# Patient Record
Sex: MALE | Race: WHITE
[De-identification: names, ages, dates, MRNs, and addresses within clinical notes are randomized per-mention and may not be internally consistent; named-entity substitution may affect disease eponyms.]

---

## 2020-03-06 ENCOUNTER — HOSPITAL ENCOUNTER (OUTPATIENT)
Dept: HOSPITAL 92 - SCSMRI | Age: 40
Discharge: HOME | End: 2020-03-06
Attending: ORTHOPAEDIC SURGERY
Payer: COMMERCIAL

## 2020-03-06 DIAGNOSIS — Z98.890: ICD-10-CM

## 2020-03-06 DIAGNOSIS — S69.82XA: Primary | ICD-10-CM

## 2020-03-06 NOTE — MRI
MRI OF LEFT WRIST PERFORMED WITHOUT CONTRAST ENHANCEMENT:

 

HISTORY: 

Patient had wrist surgery in September of 2019 and has been having pain since the time of surgery.

 

FINDINGS: 

There is artifact related to pin and screw placement which substantially degrades image detail.  Marion
fact is particularly pronounced around the region of the distal radius with screw placement in this r
egion.  On these views, there appears to be an ulna plus variant.  I am not certain how much of this 
is related to the positioning.  The triangular fibrocartilage is difficult to assess, but I feel it i
s most likely intact.

 

Surgical anchors are seen within the scaphoid and there is artifact near the trapezium.  The scapholu
chapin and lunotriquetral joints are unremarkable.  I do not see any definite ligamentous injury.

 

Carpal tunnel region is normal.  The extensor tendons are somewhat difficult to assess at the level o
f the radius.  The ERCB and ERCL tendons appear slightly thickened.  Some minimal tenosynovitis diehl
e and some minimal edema change and what appears to be some scarring around these tendons.  There is 
what may be a small bony fragment from the dorsal side of the scaphoid in this region, again difficul
t to assess.  I am not certain if CT would be helpful in assessment of this  if patient's pain is spe
cifically related to this region.  

 

Postop changes of the metacarpal regions are also incidentally seen incompletely imaged on this exam.
  

 

IMPRESSION: 

1.  Extensive postop change degrades image quality.  Screw placement in the distal radius related to 
fixation of fracture degrades the imaging quality.  I believe the scapholunate and lunotriquetral lig
aments are intact.  On some of these images, there appears to be an ulnar plus variant.  I believe th
is may just be more related to positioning.  Triangular fibrocartilage is difficult to assess, but on
 some of these images I feel is probably intact.

 

2.  Suggestion of some slight thickening to the extensor carpi radialis brevis and extensor carpi rad
ialis longus tendons at the level of the distal radius and as they pass over the dorsal side of the r
egion of the scaphoid.  On some of these images, there is a suggestion there may be a small avulsion 
fracture off the dorsal side of the more proximal aspect of the scaphoid.  There are some trace tenos
ynovitis changes and some soft tissue edema changes adjacent to the extensor carpi radialis brevis an
d extensor carpi radialis longus tendons.

 

POS: C

## 2020-03-26 ENCOUNTER — HOSPITAL ENCOUNTER (OUTPATIENT)
Dept: HOSPITAL 92 - SCSCT | Age: 40
Discharge: HOME | End: 2020-03-26
Attending: ORTHOPAEDIC SURGERY
Payer: COMMERCIAL

## 2020-03-26 DIAGNOSIS — S62.337D: ICD-10-CM

## 2020-03-26 DIAGNOSIS — S62.035D: Primary | ICD-10-CM

## 2020-03-26 DIAGNOSIS — S62.335D: ICD-10-CM

## 2020-03-26 DIAGNOSIS — S52.502D: ICD-10-CM

## 2020-03-26 NOTE — CT
EXAM:

LEFT WRIST CT SCAN WITHOUT IV CONTRAST:

3/26/20

 

HISTORY: 

Follow-up fractures and follow-up postoperative changes. 

 

COMPARISON: 

Prior MRI, 3/6/2020 and wrist plain film examination 9/25/2019.

 

Three internal fixation pins and one internal fixation screw transversely stabilize and fixate the di
stal radial epiphysis. The previously noted displaced  somewhat comminuted radial fracture has marked
ly improved in position and alignment with evidence for some healing. There appears to be a healed fr
acture of the tip of the ulnar styloid process as well as healed fractures involving the proximal fou
rth and fifth metacarpals. There is a circumscribed 0.3 x 0.4 cm diameter ossific focus on the radial
 side of the distal radial styloid process which does not appear to involve the navicular bone. I fee
l this is probably a displaced fracture from the previously noted comminuted radial fracture that has
 not reunited. 

There is a small bony projection also noted off the distal end of the radial styloid process which ma
y still be attached to the distal radial styloid and represent at least partially attached bony fragm
ent. There is some heterogeneous attenuation within the scaphoid bone with some associated deformity 
but no evidence for an acute fracture. Scapholunate and lunatotriquetral joint regions appear intact.
 No evidence for new fractures. 

 

There are in addition, two other very small, one approximates 1 mm in diameter, the second one measur
es approximately 1 x 2 mm in diameter, small circumscribed bony densities on the radial side of the d
istal scaphoid bone near the triscaphe joint. These also appear to be old  tiny circumscribed ossific
 densities possibly residual tiny chip type fractures. 

 

IMPRESSION: 

Evidence for healing distal radial and proximal fourth and fifth metatarsal fractures. No evidence fo
r acute fracture. Minimal deformity of the scaphoid bone, evidence for old injury but again, no acute
 fracture or dislocation. Several small ossific foci which are circumscribed and noted on the radial 
side of the distal radial styloid process as well as on the radial side of the distal scaphoid bone n
ear the triscaphe joint but these do not have the appearance of acute chip type fractures. 

 

The two very tiny areas of ossification are best seen on axial image 37 and the two slightly larger a
reas of circumscribed ossification are best seen on axial 41. 

 

POS: RRE